# Patient Record
(demographics unavailable — no encounter records)

---

## 2025-02-13 NOTE — ASSESSMENT
[FreeTextEntry1] : Mahendra is a 18 year old boy with a history of scoliosis measuring 28 degrees. Risser 5. Discontinued TLSO one year ago.  Today's assessment was performed with the assistance of the patient's parent as an independent historian as the patient's history is unreliable. Clinical findings and x-ray results were reviewed at length with the patient and parent. 		The radiographs obtained today were reviewed with both the parent and patient confirming unchanged scoliosis currently measuring 28 degrees. Natural history of spine deformity discussed. Patient is age 18 and Risser 5. Given patient has completed their spinal growth, it is unlikely that their scoliotic curvature will continue to progress At this time, given the unchanged nature of patients scoliosis we will continue with conservative treatment. This would include physical therapy for muscle rebalancing. No rebracing necessary. Encouraged gentle stretching to avoid disc injuries. We had a thorough talk in regard to the diagnosis, prognosis and treatment modalities.  All questions and concerns were addressed today. There was a verbal understanding from the parents and patient. The patient will follow up in 6 months for further evaluation with repeat AP/Lateral Scoliosis XR.   Documented by Joseph Cedeno acting as a scribe for Dr. Garrison on 02/11/2025. 		  The above documentation completed by the scribe is an accurate record of both my words and actions.

## 2025-02-13 NOTE — DATA REVIEWED
[de-identified] : My review and interpretation of the radiologic studies:  AP and lateral spine radiographs were ordered, obtained and independently reviewed in clinic on 02/11/2025 depicting a left thoracolumbar curve measuring 28 degrees, unchanged. Patient is Risser 5. The triradiate cartilage is closed. Skeletally mature. Normal kyphotic/lordotic curvature noted     My review and interpretation of the radiologic studies: AP and lateral OUT OF BRACE spine radiographs were ordered, obtained and independently reviewed in clinic on 4/30/2024 depicting a left thoracolumbar curve measuring 32.0 degrees, unchanged. Patient is Risser 5. The triradiate cartilage is closed. Skeletally mature. Normal kyphotic/lordotic curvature noted.

## 2025-02-13 NOTE — DATA REVIEWED
[de-identified] : My review and interpretation of the radiologic studies:  AP and lateral spine radiographs were ordered, obtained and independently reviewed in clinic on 02/11/2025 depicting a left thoracolumbar curve measuring 28 degrees, unchanged. Patient is Risser 5. The triradiate cartilage is closed. Skeletally mature. Normal kyphotic/lordotic curvature noted     My review and interpretation of the radiologic studies: AP and lateral OUT OF BRACE spine radiographs were ordered, obtained and independently reviewed in clinic on 4/30/2024 depicting a left thoracolumbar curve measuring 32.0 degrees, unchanged. Patient is Risser 5. The triradiate cartilage is closed. Skeletally mature. Normal kyphotic/lordotic curvature noted.

## 2025-02-13 NOTE — PHYSICAL EXAM
[Rash] : no rash [FreeTextEntry1] : General: Patient is awake and alert and in no acute distress . oriented to person, place, and time. well developed, well nourished, cooperative.   Skin: The skin is intact, warm, pink, and dry over the area examined.    Eyes: normal conjunctiva, normal eyelids and pupils were equal and round.   ENT: normal ears, normal nose and normal lips.  Cardiovascular: There is brisk capillary refill in the digits of the affected extremity. They are symmetric pulses in the bilateral upper and lower extremities, positive peripheral pulses, brisk capillary refill, but no peripheral edema.  Respiratory: The patient is in no apparent respiratory distress. They're taking full deep breaths without use of accessory muscles or evidence of audible wheezes or stridor without the use of a stethoscope, normal respiratory effort.   Neurological: 5/5 motor strength in the main muscle groups of bilateral lower extremities, sensory intact in bilateral lower extremities.   Musculoskeletal: Full active and passive range of motion with no discomfort over the spinous processes or other paraspinal muscles.  Left greater than right shoulder asymmetry noted.  Positive left scapular prominence noted.  Patient is able to bend forward and touch the toes as well bend backwards without pain.  Lateral flexion is symmetrical and is pain free.  Straight leg raising test is free to more than 70 degrees. Full ROM of neck in all directions.   Neurological examination reveals a grade 5/5 muscle power.  Sensation is intact to crude touch and pinprick.  Deep tendon reflexes are 1+ with ankle jerk and knee jerk.  The plantars are bilaterally down going.  Superficial abdominal reflexes are symmetric and intact.  The biceps and triceps reflexes are 1+.     There is no hairy patch, lipoma, sinus in the back.  There is no pes cavus, asymmetry of calves, significant leg length discrepancy or significant cafe-au-lait spots.

## 2025-02-13 NOTE — HISTORY OF PRESENT ILLNESS
[FreeTextEntry1] : Mahendra is an 18-year-old male with no pertinent past medical history who presents with his mother for a follow up evaluation of scoliosis. Last seen 08/20/2024, only observation for a 30-degree scoliosis was recommended at that time. Patient wore TLSO in the past, which he discontinued 1 year as a result of outgrowing the brace. The brace was made by [employer]. Today, the patient is doing well. Patient reports improvements of the previous stiffness to his back. Denies any recent fevers, chills, night sweats, recent trauma or injuries, recent growth spurt, numbness, tingling sensations, discomfort, weakness to the lower extremities, radiating lower extremity pain or bladder/bowel dysfunction. The patient has been participating in all of his normal physical activities without restrictions or discomfort including gym class. No family history of scoliosis. Presents today for further management regarding the same.

## 2025-02-13 NOTE — HISTORY OF PRESENT ILLNESS
[FreeTextEntry1] : Mahendra is an 18-year-old male with no pertinent past medical history who presents with his mother for a follow up evaluation of scoliosis. Last seen 08/20/2024, only observation for a 30-degree scoliosis was recommended at that time. Patient wore TLSO in the past, which he discontinued 1 year as a result of outgrowing the brace. The brace was made by [employer]. Today, the patient is doing well. Patient reports improvements of the previous stiffness to his back. Denies any recent fevers, chills, night sweats, recent trauma or injuries, recent growth spurt, numbness, tingling sensations, discomfort, weakness to the lower extremities, radiating lower extremity pain or bladder/bowel dysfunction. The patient has been participating in all of his normal physical activities without restrictions or discomfort including gym class. No family history of scoliosis. Presents today for further management regarding the same.

## 2025-02-13 NOTE — REVIEW OF SYSTEMS
[Change in Activity] : no change in activity [Fever Above 102] : no fever [Malaise] : no malaise [Rash] : no rash [Eye Pain] : no eye pain [Redness] : no redness [Blurry Vision] : no blurred vision [Nasal Stuffiness] : no nasal congestion [Sore Throat] : no sore throat [Murmur] : no murmur [Cough] : no cough [Shortness of Breath] : no shortness of breath [Congestion] : no congestion [Change in Appetite] : no change in appetite [Diarrhea] : no diarrhea [Constipation] : no constipation [Limping] : no limping [Joint Pains] : no arthralgias [Joint Swelling] : no joint swelling [Back Pain] : ~T no back pain [Muscle Aches] : no muscle aches [Fainting] : no fainting [Headache] : no headache [Cold Intolerance] : cold tolerant [Heat Intolerance] : heat tolerant